# Patient Record
(demographics unavailable — no encounter records)

---

## 2024-11-18 NOTE — HISTORY OF PRESENT ILLNESS
[TextBox_4] : Prior: Kimberly Louise is a 60 year old female, with history of cryptogenic organizing pneumonia, who presents to the office for follow up evaluation. Patient reports that she is feeling well overall with no respiratory complaints. She states of having an episode of fatigue and dizziness 2 months ago. Here to review recent chest CT scan.  Current: 60 year old female, with history of cryptogenic organizing pneumonia, who presents to the office for follow up evaluation. Patient reports that she is feeling well overall with no respiratory complaints. Pt denies cough or fever.

## 2024-11-18 NOTE — ADDENDUM
[FreeTextEntry1] : I, Man Mathewjunebob, acted solely as a scribe for Dr. Randell Ramsey M.D. on this date 11/18/2024.   All medical record entries made by the Scribe were at my, Dr. Randell Ramsey M.D., direction and personally dictated by me on 11/18/2024. I have reviewed the chart and agree that the record accurately reflects my personal performance of the history, physical exam, assessment and plan. I have also personally directed, reviewed, and agreed with the chart.

## 2024-11-18 NOTE — PROCEDURE
[FreeTextEntry1] : Chest Xray performed during office visit today Reveals no significant findings PFT demonstrates interval decline in spirometry with stable diffusing capacity

## 2024-11-18 NOTE — ASSESSMENT
[FreeTextEntry1] : Pulmonary status clinically stable with decline in spirometric indices.  Chest x-ray clear should have follow-up chest CT If chest CT shows progression we will need to consider lung biopsy for definitive diagnosis prior to treatment. Biopsy may also help serve to clarify whether this is a post 9/11 or a post-COVID issue

## 2024-12-19 NOTE — ADDENDUM
[FreeTextEntry1] :  I, Derek Gillsumiin, acted solely as a scribe for Dr. Randell Ramsey M.D. on this date 12/19/2024.   All medical record entries made by the Scribe were at my, Dr. Randell Ramsey M.D., direction and personally dictated by me on 12/19/2024. I have reviewed the chart and agree that the record accurately reflects my personal performance of the history, physical exam, assessment and plan. I have also personally directed, reviewed, and agreed with the chart.

## 2024-12-19 NOTE — PROCEDURE
[FreeTextEntry1] : Spirometry indicates decline with strong bronchodilator response  6-minute walk test Demonstrates no oxygen desaturation  CT chest demonstrates near complete resolution of centrilobular groundglass opacity

## 2024-12-19 NOTE — HISTORY OF PRESENT ILLNESS
[TextBox_4] : Kimberly Louise is a 61-year-old female with a history of cryptogenic organizing pneumonia presents for follow-up evaluation. She reports a persistent cough, increased respiratory effort, and chest heaviness. This visit is a follow-up for a recent chest CT.

## 2024-12-19 NOTE — ASSESSMENT
[FreeTextEntry1] : Confusing picture.  CT is improved. Spirometry prebronchodilator highly abnormal with large post bronchodilator increment.  Suspect prebronchodilator data is erroneous.  For now we will prescribe albuterol as as needed only in.  Patient back for short-term follow-up.  If patient does indeed have asthma this would explain the worsening of respiratory symptoms with an improving CT scan

## 2025-01-23 NOTE — ADDENDUM
[FreeTextEntry1] :  I, Derek Gillsumiin, acted solely as a scribe for Dr. Randell Ramsey M.D. on this date 01/23/2025.   All medical record entries made by the Scribe were at my, Dr. Randell Ramsey M.D., direction and personally dictated by me on 01/23/2025. I have reviewed the chart and agree that the record accurately reflects my personal performance of the history, physical exam, assessment and plan. I have also personally directed, reviewed, and agreed with the chart.

## 2025-01-23 NOTE — PROCEDURE
[FreeTextEntry1] : Spirometry demonstrates interval improvement with variable and inconsistent results.

## 2025-01-23 NOTE — ASSESSMENT
[FreeTextEntry1] : Do not think patient has asthma.  Patient has significant variability with spirometry and does not do test reliably and reproducibly.  Fortunately DLCO appears reproducible and is the critical test for following her condition.  Follow-up 6 months unless symptomatic

## 2025-01-23 NOTE — PHYSICAL EXAM
(4) no limitation [No Acute Distress] : no acute distress [Normal Oropharynx] : normal oropharynx [Normal Appearance] : normal appearance [No Neck Mass] : no neck mass [Normal Rate/Rhythm] : normal rate/rhythm [Normal S1, S2] : normal s1, s2 [No Murmurs] : no murmurs [No Resp Distress] : no resp distress [Clear to Auscultation Bilaterally] : clear to auscultation bilaterally [No Abnormalities] : no abnormalities [Benign] : benign [Normal Gait] : normal gait [No Clubbing] : no clubbing [No Cyanosis] : no cyanosis [No Edema] : no edema [FROM] : FROM [Normal Color/ Pigmentation] : normal color/ pigmentation [No Focal Deficits] : no focal deficits [Oriented x3] : oriented x3 [Normal Affect] : normal affect

## 2025-01-23 NOTE — HISTORY OF PRESENT ILLNESS
[TextBox_4] : Kimberly Louise is a 61-year-old female with a history of cryptogenic organizing pneumonia presents for follow-up evaluation. At last visit was noted to have abnormal spirometry with findings suggestive of asthma.  In the interim she feels completely well and has not used any inhaler since her visit with me